# Patient Record
Sex: MALE | Race: WHITE | Employment: FULL TIME | URBAN - METROPOLITAN AREA
[De-identification: names, ages, dates, MRNs, and addresses within clinical notes are randomized per-mention and may not be internally consistent; named-entity substitution may affect disease eponyms.]

---

## 2020-03-05 ENCOUNTER — APPOINTMENT (OUTPATIENT)
Dept: GENERAL RADIOLOGY | Age: 35
End: 2020-03-05
Payer: COMMERCIAL

## 2020-03-05 ENCOUNTER — HOSPITAL ENCOUNTER (EMERGENCY)
Age: 35
Discharge: HOME | End: 2020-03-05
Payer: COMMERCIAL

## 2020-03-05 VITALS
DIASTOLIC BLOOD PRESSURE: 88 MMHG | HEART RATE: 80 BPM | TEMPERATURE: 98.1 F | SYSTOLIC BLOOD PRESSURE: 149 MMHG | RESPIRATION RATE: 14 BRPM | OXYGEN SATURATION: 100 %

## 2020-03-05 LAB
ALBUMIN SERPL-MCNC: 4.6 G/DL (ref 3.5–4.6)
ALP BLD-CCNC: 56 U/L (ref 35–104)
ALT SERPL-CCNC: 41 U/L (ref 0–41)
ANION GAP SERPL CALCULATED.3IONS-SCNC: 11 MEQ/L (ref 9–15)
AST SERPL-CCNC: 32 U/L (ref 0–40)
BASOPHILS ABSOLUTE: 0.1 K/UL (ref 0–0.2)
BASOPHILS RELATIVE PERCENT: 0.9 %
BILIRUB SERPL-MCNC: 0.3 MG/DL (ref 0.2–0.7)
BUN BLDV-MCNC: 12 MG/DL (ref 6–20)
CALCIUM SERPL-MCNC: 9.3 MG/DL (ref 8.5–9.9)
CHLORIDE BLD-SCNC: 102 MEQ/L (ref 95–107)
CK MB: 4.6 NG/ML (ref 0–6.7)
CO2: 27 MEQ/L (ref 20–31)
CREAT SERPL-MCNC: 1.08 MG/DL (ref 0.7–1.2)
CREATINE KINASE-MB INDEX: 0.8 % (ref 0–3.5)
D DIMER: <0.27 MG/L FEU (ref 0–0.5)
EKG ATRIAL RATE: 65 BPM
EKG P AXIS: 56 DEGREES
EKG P-R INTERVAL: 156 MS
EKG Q-T INTERVAL: 392 MS
EKG QRS DURATION: 112 MS
EKG QTC CALCULATION (BAZETT): 407 MS
EKG R AXIS: 1 DEGREES
EKG T AXIS: 36 DEGREES
EKG VENTRICULAR RATE: 65 BPM
EOSINOPHILS ABSOLUTE: 0.1 K/UL (ref 0–0.7)
EOSINOPHILS RELATIVE PERCENT: 1.2 %
GFR AFRICAN AMERICAN: >60
GFR NON-AFRICAN AMERICAN: >60
GLOBULIN: 2.7 G/DL (ref 2.3–3.5)
GLUCOSE BLD-MCNC: 116 MG/DL (ref 70–99)
HCT VFR BLD CALC: 42.9 % (ref 42–52)
HEMOGLOBIN: 14.9 G/DL (ref 14–18)
LYMPHOCYTES ABSOLUTE: 1.9 K/UL (ref 1–4.8)
LYMPHOCYTES RELATIVE PERCENT: 26.1 %
MCH RBC QN AUTO: 30 PG (ref 27–31.3)
MCHC RBC AUTO-ENTMCNC: 34.7 % (ref 33–37)
MCV RBC AUTO: 86.5 FL (ref 80–100)
MONOCYTES ABSOLUTE: 0.5 K/UL (ref 0.2–0.8)
MONOCYTES RELATIVE PERCENT: 7 %
NEUTROPHILS ABSOLUTE: 4.8 K/UL (ref 1.4–6.5)
NEUTROPHILS RELATIVE PERCENT: 64.8 %
PDW BLD-RTO: 13 % (ref 11.5–14.5)
PLATELET # BLD: 212 K/UL (ref 130–400)
POTASSIUM SERPL-SCNC: 3.8 MEQ/L (ref 3.4–4.9)
RBC # BLD: 4.96 M/UL (ref 4.7–6.1)
SODIUM BLD-SCNC: 140 MEQ/L (ref 135–144)
TOTAL CK: 583 U/L (ref 0–190)
TOTAL PROTEIN: 7.3 G/DL (ref 6.3–8)
TROPONIN: <0.01 NG/ML (ref 0–0.01)
WBC # BLD: 7.4 K/UL (ref 4.8–10.8)

## 2020-03-05 PROCEDURE — 71045 X-RAY EXAM CHEST 1 VIEW: CPT

## 2020-03-05 PROCEDURE — 84484 ASSAY OF TROPONIN QUANT: CPT

## 2020-03-05 PROCEDURE — 99285 EMERGENCY DEPT VISIT HI MDM: CPT

## 2020-03-05 PROCEDURE — 36415 COLL VENOUS BLD VENIPUNCTURE: CPT

## 2020-03-05 PROCEDURE — 82553 CREATINE MB FRACTION: CPT

## 2020-03-05 PROCEDURE — 96374 THER/PROPH/DIAG INJ IV PUSH: CPT

## 2020-03-05 PROCEDURE — 6360000002 HC RX W HCPCS: Performed by: PHYSICIAN ASSISTANT

## 2020-03-05 PROCEDURE — 93005 ELECTROCARDIOGRAM TRACING: CPT | Performed by: PHYSICIAN ASSISTANT

## 2020-03-05 PROCEDURE — 82550 ASSAY OF CK (CPK): CPT

## 2020-03-05 PROCEDURE — 2580000003 HC RX 258: Performed by: PHYSICIAN ASSISTANT

## 2020-03-05 PROCEDURE — 85379 FIBRIN DEGRADATION QUANT: CPT

## 2020-03-05 PROCEDURE — 80053 COMPREHEN METABOLIC PANEL: CPT

## 2020-03-05 PROCEDURE — 85025 COMPLETE CBC W/AUTO DIFF WBC: CPT

## 2020-03-05 RX ORDER — KETOROLAC TROMETHAMINE 30 MG/ML
30 INJECTION, SOLUTION INTRAMUSCULAR; INTRAVENOUS ONCE
Status: COMPLETED | OUTPATIENT
Start: 2020-03-05 | End: 2020-03-05

## 2020-03-05 RX ORDER — ETODOLAC 400 MG/1
400 TABLET, FILM COATED ORAL 2 TIMES DAILY
Qty: 14 TABLET | Refills: 0 | Status: SHIPPED | OUTPATIENT
Start: 2020-03-05

## 2020-03-05 RX ORDER — 0.9 % SODIUM CHLORIDE 0.9 %
1000 INTRAVENOUS SOLUTION INTRAVENOUS ONCE
Status: COMPLETED | OUTPATIENT
Start: 2020-03-05 | End: 2020-03-05

## 2020-03-05 RX ADMIN — SODIUM CHLORIDE 1000 ML: 9 INJECTION, SOLUTION INTRAVENOUS at 15:38

## 2020-03-05 RX ADMIN — KETOROLAC TROMETHAMINE 30 MG: 30 INJECTION, SOLUTION INTRAMUSCULAR; INTRAVENOUS at 15:38

## 2020-03-05 ASSESSMENT — ENCOUNTER SYMPTOMS
EYE PAIN: 0
ALLERGIC/IMMUNOLOGIC NEGATIVE: 1
APNEA: 0
COLOR CHANGE: 0
TROUBLE SWALLOWING: 0
ABDOMINAL PAIN: 0
SHORTNESS OF BREATH: 1

## 2020-03-05 ASSESSMENT — PAIN SCALES - GENERAL
PAINLEVEL_OUTOF10: 2
PAINLEVEL_OUTOF10: 2

## 2020-03-05 NOTE — ED NOTES
Bed: 25  Expected date:   Expected time:   Means of arrival:   Comments:  30 male  Left shoulder pain     Arturo Left, RN  03/05/20 [id number]

## 2020-03-05 NOTE — ED PROVIDER NOTES
[address] [facility] ED  eMERGENCY dEPARTMENTeNCOUnter      Pt Name: Trent Martines  MRN: [id number]  [place] 1985  Date ofevaluation: 3/5/2020  Provider: Steve Ochoa PA-C    CHIEF COMPLAINT       Chief Complaint   Patient presents with    Other     pt c/o left arm and shoulder pain no h/o trauma to arm or shoulder  anxiety    states he has a h/o  and has medications           HISTORY OF PRESENT ILLNESS   (Location/Symptom, Timing/Onset,Context/Setting, Quality, Duration, Modifying Factors, Severity)  Note limiting factors. Trent Martines is a 30 y.o. male who presents to the emergency department with complaints of intermittent episodes of left arm pain and associated left axillary chest pain, ongoing for the past several days. Patient states that pain has intermittently been pleuritic in nature. Patient was driving home from work today and states that he was having this axillary chest pain and had associated palpitations, feeling like his heart rate continued to go up and up. Patient states that he had to pull off the side of the road. Patient states that the symptoms have improved after returning the emergency department but he is still having slight chest pain. HPI    NursingNotes were reviewed. REVIEW OF SYSTEMS    (2-9 systems for level 4, 10 or more for level 5)     Review of Systems   Constitutional: Negative for diaphoresis and fever. HENT: Negative for hearing loss and trouble swallowing. Eyes: Negative for pain. Respiratory: Positive for shortness of breath. Negative for apnea. Cardiovascular: Positive for chest pain and palpitations. Gastrointestinal: Negative for abdominal pain. Endocrine: Negative. Genitourinary: Negative for hematuria. Musculoskeletal: Negative for neck pain and neck stiffness. Skin: Negative for color change. Allergic/Immunologic: Negative. Neurological: Negative for dizziness and numbness. Hematological: Negative. Psychiatric/Behavioral: The patient is nervous/anxious. All other systems reviewed and are negative. Except as noted above the remainder of the review of systems was reviewed and negative. PAST MEDICAL HISTORY     Past Medical History:   Diagnosis Date    Bronchitis     sinobronchitis     Depression     Hypercholesterolemia     Lumbar sprain     Lower lumbar midline secondary to spasm     Seasonal allergic rhinitis          SURGICALHISTORY       Past Surgical History:   Procedure Laterality Date    FINGER SURGERY  2006    Right thumb          CURRENT MEDICATIONS       Previous Medications    No medications on file       ALLERGIES     Patient has no known allergies.     FAMILY HISTORY       Family History   Problem Relation Age of Onset    Heart Disease Father     Asthma Father     Diabetes Maternal Grandmother     Heart Disease Maternal Grandmother           SOCIAL HISTORY       Social History     Socioeconomic History    Marital status:      Spouse name: Not on file    Number of children: Not on file    Years of education: Not on file    Highest education level: Not on file   Occupational History    Not on file   Social Needs    Financial resource strain: Not on file    Food insecurity:     Worry: Not on file     Inability: Not on file    Transportation needs:     Medical: Not on file     Non-medical: Not on file   Tobacco Use    Smoking status: Never Smoker    Smokeless tobacco: Never Used   Substance and Sexual Activity    Alcohol use: No    Drug use: No    Sexual activity: Not on file   Lifestyle    Physical activity:     Days per week: Not on file     Minutes per session: Not on file    Stress: Not on file   Relationships    Social connections:     Talks on phone: Not on file     Gets together: Not on file     Attends Alevism service: Not on file     Active member of club or organization: Not on file     Attends meetings of clubs or organizations: Not on file Relationship status: Not on file    Intimate partner violence:     Fear of current or ex partner: Not on file     Emotionally abused: Not on file     Physically abused: Not on file     Forced sexual activity: Not on file   Other Topics Concern    Not on file   Social History Narrative    Not on file       SCREENINGS              PHYSICAL EXAM    (up to 7 for level 4, 8 or more for level 5)     ED Triage Vitals [03/05/20 1515]   BP Temp Temp src Pulse Resp SpO2 Height Weight   (!) 158/92 98.1 °F (36.7 °C) -- 82 16 99 % -- --       Physical Exam  Vitals signs and nursing note reviewed. Constitutional:       General: He is not in acute distress. Appearance: He is well-developed. He is not diaphoretic. HENT:      Head: Normocephalic and atraumatic. Mouth/Throat:      Pharynx: No oropharyngeal exudate. Eyes:      General: No scleral icterus. Conjunctiva/sclera: Conjunctivae normal.      Pupils: Pupils are equal, round, and reactive to light. Neck:      Musculoskeletal: Normal range of motion and neck supple. Trachea: No tracheal deviation. Cardiovascular:      Rate and Rhythm: Normal rate. Heart sounds: Normal heart sounds. Pulmonary:      Effort: Pulmonary effort is normal. No respiratory distress. Breath sounds: Normal breath sounds. Chest:      Chest wall: Tenderness present. No deformity or swelling. Abdominal:      General: Bowel sounds are normal. There is no distension. Palpations: Abdomen is soft. Musculoskeletal: Normal range of motion. Skin:     General: Skin is warm and dry. Findings: No erythema or rash. Neurological:      Mental Status: He is alert and oriented to person, place, and time. Cranial Nerves: No cranial nerve deficit. Motor: No abnormal muscle tone. Psychiatric:         Behavior: Behavior normal.         Thought Content: Thought content normal.         Judgment: Judgment normal.         DIAGNOSTIC RESULTS     EKG:  All Prescriptions    ETODOLAC (LODINE) 400 MG TABLET    Take 1 tablet by mouth 2 times daily     Controlled Substances Monitoring:     No flowsheet data found.     (Please note that portions of this note were completed with a voice recognition program.  Efforts were made to edit the dictations but occasionally words are mis-transcribed.)    Josey Croft PA-C (electronically signed)  Attending Emergency Physician          Josey Croft PA-C  03/05/20 2883

## 2020-03-05 NOTE — ED TRIAGE NOTES
Pt a/ox3 skn w d intact pulse stronga ndregular no visible signs of trauma noted toleft arm   Pt shows no distress

## 2020-03-06 PROCEDURE — 93010 ELECTROCARDIOGRAM REPORT: CPT | Performed by: INTERNAL MEDICINE

## 2023-11-27 ENCOUNTER — LAB (OUTPATIENT)
Dept: LAB | Facility: LAB | Age: 38
End: 2023-11-27
Payer: COMMERCIAL

## 2023-11-27 DIAGNOSIS — Z00.00 ENCOUNTER FOR GENERAL ADULT MEDICAL EXAMINATION WITHOUT ABNORMAL FINDINGS: ICD-10-CM

## 2023-11-27 DIAGNOSIS — F41.9 ANXIETY DISORDER, UNSPECIFIED: Primary | ICD-10-CM

## 2023-11-27 DIAGNOSIS — I10 ESSENTIAL (PRIMARY) HYPERTENSION: ICD-10-CM

## 2023-11-27 LAB
ALBUMIN SERPL BCP-MCNC: 4.7 G/DL (ref 3.4–5)
ALP SERPL-CCNC: 60 U/L (ref 33–120)
ALT SERPL W P-5'-P-CCNC: 70 U/L (ref 10–52)
ANION GAP SERPL CALC-SCNC: 10 MMOL/L (ref 10–20)
AST SERPL W P-5'-P-CCNC: 25 U/L (ref 9–39)
BASOPHILS # BLD AUTO: 0.05 X10*3/UL (ref 0–0.1)
BASOPHILS NFR BLD AUTO: 0.6 %
BILIRUB SERPL-MCNC: 0.6 MG/DL (ref 0–1.2)
BUN SERPL-MCNC: 16 MG/DL (ref 6–23)
CALCIUM SERPL-MCNC: 9.7 MG/DL (ref 8.6–10.3)
CHLORIDE SERPL-SCNC: 100 MMOL/L (ref 98–107)
CHOLEST SERPL-MCNC: 197 MG/DL (ref 0–199)
CHOLESTEROL/HDL RATIO: 5
CO2 SERPL-SCNC: 32 MMOL/L (ref 21–32)
CREAT SERPL-MCNC: 1.26 MG/DL (ref 0.5–1.3)
EOSINOPHIL # BLD AUTO: 0.13 X10*3/UL (ref 0–0.7)
EOSINOPHIL NFR BLD AUTO: 1.7 %
ERYTHROCYTE [DISTWIDTH] IN BLOOD BY AUTOMATED COUNT: 12 % (ref 11.5–14.5)
GFR SERPL CREATININE-BSD FRML MDRD: 76 ML/MIN/1.73M*2
GLUCOSE SERPL-MCNC: 87 MG/DL (ref 74–99)
HCT VFR BLD AUTO: 43.8 % (ref 41–52)
HDLC SERPL-MCNC: 39.6 MG/DL
HGB BLD-MCNC: 15 G/DL (ref 13.5–17.5)
IMM GRANULOCYTES # BLD AUTO: 0.04 X10*3/UL (ref 0–0.7)
IMM GRANULOCYTES NFR BLD AUTO: 0.5 % (ref 0–0.9)
LDLC SERPL CALC-MCNC: 79 MG/DL
LYMPHOCYTES # BLD AUTO: 1.66 X10*3/UL (ref 1.2–4.8)
LYMPHOCYTES NFR BLD AUTO: 21.1 %
MCH RBC QN AUTO: 30.2 PG (ref 26–34)
MCHC RBC AUTO-ENTMCNC: 34.2 G/DL (ref 32–36)
MCV RBC AUTO: 88 FL (ref 80–100)
MONOCYTES # BLD AUTO: 0.91 X10*3/UL (ref 0.1–1)
MONOCYTES NFR BLD AUTO: 11.6 %
NEUTROPHILS # BLD AUTO: 5.07 X10*3/UL (ref 1.2–7.7)
NEUTROPHILS NFR BLD AUTO: 64.5 %
NON HDL CHOLESTEROL: 157 MG/DL (ref 0–149)
NRBC BLD-RTO: 0 /100 WBCS (ref 0–0)
PLATELET # BLD AUTO: 231 X10*3/UL (ref 150–450)
POTASSIUM SERPL-SCNC: 3.7 MMOL/L (ref 3.5–5.3)
PROT SERPL-MCNC: 7.1 G/DL (ref 6.4–8.2)
RBC # BLD AUTO: 4.96 X10*6/UL (ref 4.5–5.9)
SODIUM SERPL-SCNC: 138 MMOL/L (ref 136–145)
TRIGL SERPL-MCNC: 394 MG/DL (ref 0–149)
VLDL: 79 MG/DL (ref 0–40)
WBC # BLD AUTO: 7.9 X10*3/UL (ref 4.4–11.3)

## 2023-11-27 PROCEDURE — 86780 TREPONEMA PALLIDUM: CPT

## 2023-11-27 PROCEDURE — 87522 HEPATITIS C REVRS TRNSCRPJ: CPT

## 2023-11-27 PROCEDURE — 87389 HIV-1 AG W/HIV-1&-2 AB AG IA: CPT

## 2023-11-27 PROCEDURE — 80061 LIPID PANEL: CPT

## 2023-11-27 PROCEDURE — 85025 COMPLETE CBC W/AUTO DIFF WBC: CPT

## 2023-11-27 PROCEDURE — 80053 COMPREHEN METABOLIC PANEL: CPT

## 2023-11-27 PROCEDURE — 36415 COLL VENOUS BLD VENIPUNCTURE: CPT

## 2023-11-28 LAB
HCV RNA SERPL NAA+PROBE-ACNC: NOT DETECTED K[IU]/ML
HCV RNA SERPL NAA+PROBE-LOG IU: NORMAL {LOG_IU}/ML
HIV 1+2 AB+HIV1 P24 AG SERPL QL IA: NONREACTIVE
T PALLIDUM AB SER QL: NONREACTIVE

## 2024-04-03 ENCOUNTER — OFFICE VISIT (OUTPATIENT)
Dept: PRIMARY CARE | Facility: CLINIC | Age: 39
End: 2024-04-03
Payer: COMMERCIAL

## 2024-04-03 VITALS
SYSTOLIC BLOOD PRESSURE: 130 MMHG | DIASTOLIC BLOOD PRESSURE: 79 MMHG | BODY MASS INDEX: 34.36 KG/M2 | RESPIRATION RATE: 16 BRPM | HEIGHT: 69 IN | OXYGEN SATURATION: 95 % | TEMPERATURE: 99.3 F | WEIGHT: 232 LBS | HEART RATE: 93 BPM

## 2024-04-03 DIAGNOSIS — R05.9 COUGH IN ADULT PATIENT: ICD-10-CM

## 2024-04-03 DIAGNOSIS — R09.82 POST-NASAL DRIP: ICD-10-CM

## 2024-04-03 DIAGNOSIS — J02.9 PHARYNGITIS, UNSPECIFIED ETIOLOGY: Primary | ICD-10-CM

## 2024-04-03 DIAGNOSIS — E66.9 CLASS 1 OBESITY WITH BODY MASS INDEX (BMI) OF 34.0 TO 34.9 IN ADULT, UNSPECIFIED OBESITY TYPE, UNSPECIFIED WHETHER SERIOUS COMORBIDITY PRESENT: ICD-10-CM

## 2024-04-03 PROCEDURE — 99203 OFFICE O/P NEW LOW 30 MIN: CPT | Performed by: NURSE PRACTITIONER

## 2024-04-03 RX ORDER — BUPROPION HYDROCHLORIDE 150 MG/1
150 TABLET ORAL DAILY
COMMUNITY

## 2024-04-03 RX ORDER — PROPRANOLOL HYDROCHLORIDE 60 MG/1
60 CAPSULE, EXTENDED RELEASE ORAL DAILY
COMMUNITY
Start: 2023-11-10

## 2024-04-03 RX ORDER — HYDROXYZINE HYDROCHLORIDE 25 MG/1
25 TABLET, FILM COATED ORAL 2 TIMES DAILY PRN
COMMUNITY
Start: 2024-02-26

## 2024-04-03 RX ORDER — PAROXETINE HYDROCHLORIDE 20 MG/1
20 TABLET, FILM COATED ORAL DAILY
COMMUNITY

## 2024-04-03 RX ORDER — AMOXICILLIN 875 MG/1
875 TABLET, FILM COATED ORAL 2 TIMES DAILY
Qty: 20 TABLET | Refills: 0 | Status: SHIPPED | OUTPATIENT
Start: 2024-04-03 | End: 2024-04-13

## 2024-04-03 RX ORDER — HYDROCHLOROTHIAZIDE 25 MG/1
25 TABLET ORAL DAILY
COMMUNITY
Start: 2024-02-05

## 2024-04-03 RX ORDER — BUPROPION HYDROCHLORIDE 150 MG/1
TABLET, EXTENDED RELEASE ORAL
COMMUNITY
Start: 2019-11-01

## 2024-04-03 ASSESSMENT — PATIENT HEALTH QUESTIONNAIRE - PHQ9
SUM OF ALL RESPONSES TO PHQ9 QUESTIONS 1 AND 2: 0
1. LITTLE INTEREST OR PLEASURE IN DOING THINGS: NOT AT ALL
2. FEELING DOWN, DEPRESSED OR HOPELESS: NOT AT ALL
2. FEELING DOWN, DEPRESSED OR HOPELESS: NOT AT ALL
SUM OF ALL RESPONSES TO PHQ9 QUESTIONS 1 AND 2: 0
1. LITTLE INTEREST OR PLEASURE IN DOING THINGS: NOT AT ALL

## 2024-04-03 ASSESSMENT — ENCOUNTER SYMPTOMS
LOSS OF SENSATION IN FEET: 0
OCCASIONAL FEELINGS OF UNSTEADINESS: 0
DEPRESSION: 0

## 2024-04-03 NOTE — PROGRESS NOTES
"Subjective   Patient ID: Gavin Garcia is a 38 y.o. male who presents for URI.    HPI   Symptoms: ear pain (R), cough, headaches, sore throat,trouble swallowing, swollen glands (R), fever 99.6, neck pain and body aches, chills and sweats.  Length of symptoms: yesterday  OTC: acetaminophen with mild help for the fever  Related information: neg covid test yesterday      Review of Systems    Objective   BP (!) 156/93 Comment: auto  Pulse 93   Temp 37.4 °C (99.3 °F)   Resp 16   Ht 1.753 m (5' 9\")   Wt 105 kg (232 lb)   SpO2 95%   BMI 34.26 kg/m²     Physical Exam    Assessment/Plan          "

## 2024-04-03 NOTE — PROGRESS NOTES
Subjective   Patient ID: Gavin Garcia is a 38 y.o. male who is with a chief complaint of symptoms of respiratory tract infection.     HPI  Patient is a 38 y.o. male who CONSULTED AT [facility] today. Patient is with complaints of right ear congestion, headache / sinus pain, sore throat, post nasal drip, mild cough, fatigue, muscle ache, chills, and fever. He denies having nasal congestion, nasal discharge, loss of sense of taste, loss of sense of smell, diarrhea, nor hoarseness. Patient states that present condition started about 5 days ago after being exposed to coworker who is having similar she denies shortness of breath, chest pain, palpitations, nor edema. he stated that he  tried OTC medications which afforded only slight relief of symptoms. he denies nausea, vomiting, abdominal pain, nor any other symptoms.    Patient states he had his COVID vaccine.  Patient states he had the flu shot for this season.    Review of Systems  General: no weight loss, generally healthy, (+) fatigue  Head: (+) headaches / sinus pain, no vertigo, no injury  Eyes: no diplopia, no tearing, no pain,   Ears: (+) right ear congestion, no tinnitus, no bleeding, no vertigo  Mouth:  no dental difficulties, no gingival bleeding, (+) sore throat, no loss of sense of taste, (+) post nasal drip,   Nose: no congestion, no discharge, no bleeding, no obstruction, no loss of sense of smell  Neck: no stiffness, no pain, no tenderness, no masses, no bruit  Pulmonary: no dyspnea, no wheezing, no hemoptysis, (+) mild cough  Cardiovascular: no chest pain, no palpitations, no syncope, no orthopnea  Gastrointestinal: no change in appetite, no dysphagia, no abdominal pains, no diarrhea, no emesis, no melena  Genito Urinary: no dysuria, no urinary urgency, no nocturia, no incontinence, no change in nature of urine  Musculoskeletal: (+) muscle ache, no joint pain, no limitation of range of motion, no paresthesia, no  numbness  Constitutional: (+) fever, (+) chills, no night sweats    Objective   Physical Exam  General: ambulatory, in no acute distress  Head: normocephalic, no lesions, no sinus tenderness  Eyes: pink palpebral conjunctiva, anicteric sclerae, PERRLA, EOM's full  Ears: clear external auditory canals, no ear discharge, no bleeding from the ears, tympanic membrane intact  Nose: no congestion of nasal mucosa, no nasal discharge, no bleeding, no obstruction  Throat: (+) erythema, and (+) exudate on posterior pharyngeal wall, no lesion  Neck: supple, no masses, no bruits, no CLADP  Chest: symmetrical chest expansion, no lagging, no retractions, clear breath sounds, no rales, no wheezes    Assessment/Plan   Problem List Items Addressed This Visit    None  Visit Diagnoses         Codes    Pharyngitis, unspecified etiology    -  Primary J02.9    Relevant Medications    amoxicillin (Amoxil) 875 mg tablet    Cough in adult patient     R05.9    Relevant Medications    amoxicillin (Amoxil) 875 mg tablet    Post-nasal drip     R09.82    Relevant Medications    amoxicillin (Amoxil) 875 mg tablet    BMI 34.0-34.9,adult     Z68.34    Class 1 obesity with body mass index (BMI) of 34.0 to 34.9 in adult, unspecified obesity type, unspecified whether serious comorbidity present     E66.9, Z68.34        DISCHARGE SUMMARY:   Patient was seen and examined. Diagnosis, treatment, treatment options, and possible complications of today's illness discussed and explained to patient. Patient to take medication/s associated with this visit. Patient may take OTC decongestant of choice as needed. Patient may also take OTC analgesic/antipyretic if needed for pain/fever. Advised to increase oral fluid intake. Advised steam inhalation if needed to relieve congestion. Advised warm saline gargle if needed to relieve throat discomfort. Advised Listerine antiseptic mouthwash gargle TID. Patient may use Cepacol oral spray as needed to relieve throat  discomfort. Patient was advised to discard the old toothbrush and use a new toothbrush beginning on the third of antibiotics. Advised to come back if with worsening or persistent symptoms. Patient verbalized understanding of plan of care.    Patient to come back in 7 - 10 days if needed for worsening symptoms.           MAKENNA Ortiz-CNP 04/03/24 11:40 AM

## 2024-08-02 ENCOUNTER — HOSPITAL ENCOUNTER (EMERGENCY)
Age: 39
Discharge: HOME | End: 2024-08-02
Attending: GENERAL PRACTICE
Payer: COMMERCIAL

## 2024-08-02 VITALS
SYSTOLIC BLOOD PRESSURE: 131 MMHG | OXYGEN SATURATION: 97 % | HEIGHT: 69 IN | DIASTOLIC BLOOD PRESSURE: 78 MMHG | BODY MASS INDEX: 31.1 KG/M2 | HEART RATE: 70 BPM | RESPIRATION RATE: 19 BRPM | WEIGHT: 210 LBS | TEMPERATURE: 98 F

## 2024-08-02 DIAGNOSIS — T78.40XA ALLERGIC REACTION, INITIAL ENCOUNTER: Primary | ICD-10-CM

## 2024-08-02 PROCEDURE — 96375 TX/PRO/DX INJ NEW DRUG ADDON: CPT

## 2024-08-02 PROCEDURE — 2580000003 HC RX 258: Performed by: PHYSICIAN ASSISTANT

## 2024-08-02 PROCEDURE — 99284 EMERGENCY DEPT VISIT MOD MDM: CPT

## 2024-08-02 PROCEDURE — 6360000002 HC RX W HCPCS: Performed by: PHYSICIAN ASSISTANT

## 2024-08-02 PROCEDURE — 2500000003 HC RX 250 WO HCPCS: Performed by: PHYSICIAN ASSISTANT

## 2024-08-02 PROCEDURE — 96374 THER/PROPH/DIAG INJ IV PUSH: CPT

## 2024-08-02 RX ORDER — METHYLPREDNISOLONE SODIUM SUCCINATE 125 MG/2ML
125 INJECTION, POWDER, LYOPHILIZED, FOR SOLUTION INTRAMUSCULAR; INTRAVENOUS ONCE
Status: COMPLETED | OUTPATIENT
Start: 2024-08-02 | End: 2024-08-02

## 2024-08-02 RX ORDER — DIPHENHYDRAMINE HCL 25 MG
25 CAPSULE ORAL EVERY 6 HOURS PRN
Qty: 24 CAPSULE | Refills: 0 | Status: SHIPPED | OUTPATIENT
Start: 2024-08-02 | End: 2024-08-12

## 2024-08-02 RX ORDER — FAMOTIDINE 20 MG/1
20 TABLET, FILM COATED ORAL 2 TIMES DAILY
Qty: 10 TABLET | Refills: 0 | Status: SHIPPED | OUTPATIENT
Start: 2024-08-02

## 2024-08-02 RX ORDER — DIPHENHYDRAMINE HYDROCHLORIDE 50 MG/ML
25 INJECTION INTRAMUSCULAR; INTRAVENOUS ONCE
Status: COMPLETED | OUTPATIENT
Start: 2024-08-02 | End: 2024-08-02

## 2024-08-02 RX ORDER — PREDNISONE 10 MG/1
60 TABLET ORAL DAILY
Qty: 30 TABLET | Refills: 0 | Status: SHIPPED | OUTPATIENT
Start: 2024-08-02 | End: 2024-08-07

## 2024-08-02 RX ADMIN — METHYLPREDNISOLONE SODIUM SUCCINATE 125 MG: 125 INJECTION INTRAMUSCULAR; INTRAVENOUS at 22:20

## 2024-08-02 RX ADMIN — DIPHENHYDRAMINE HYDROCHLORIDE 25 MG: 50 INJECTION INTRAMUSCULAR; INTRAVENOUS at 22:19

## 2024-08-02 RX ADMIN — FAMOTIDINE 20 MG: 10 INJECTION, SOLUTION INTRAVENOUS at 22:22

## 2024-08-02 ASSESSMENT — ENCOUNTER SYMPTOMS
TROUBLE SWALLOWING: 0
SHORTNESS OF BREATH: 0
CHEST TIGHTNESS: 0

## 2024-08-02 ASSESSMENT — LIFESTYLE VARIABLES
HOW OFTEN DO YOU HAVE A DRINK CONTAINING ALCOHOL: 2-3 TIMES A WEEK
HOW MANY STANDARD DRINKS CONTAINING ALCOHOL DO YOU HAVE ON A TYPICAL DAY: PATIENT DOES NOT DRINK

## 2024-08-02 ASSESSMENT — PAIN - FUNCTIONAL ASSESSMENT
PAIN_FUNCTIONAL_ASSESSMENT: NONE - DENIES PAIN
PAIN_FUNCTIONAL_ASSESSMENT: NONE - DENIES PAIN

## 2024-08-03 NOTE — ED PROVIDER NOTES
[facility] ED  eMERGENCYdEPARTMENT eNCOUnter        Pt Name: Aleksey Finney  MRN: [id number]  Birthdate 1985of evaluation: 8/2/2024  Provider:Arnel Kidd PA-C  10:05 PM EDT    CHIEF COMPLAINT       Chief Complaint   Patient presents with    Allergic Reaction         HISTORY OF PRESENT ILLNESS  (Location/Symptom, Timing/Onset, Context/Setting, Quality, Duration, Modifying Factors, Severity.)   Aleksey Finney is a 38 y.o. male who presents to the emergency department with an urticarial rash that began approximately 10 to 20 minutes after taking his Zetia.  Patient states he has only been on the Setia for approximately 1 month.  Patient denies any angioedema symptoms, trouble swallowing, chest tightness or shortness of breath.  Patient did try taking Atarax and Benadryl which helped mildly with the symptoms.  No abdominal pain, nausea or vomiting    HPI    Nursing Notes were reviewed and I agree.    REVIEW OF SYSTEMS    (2-9 systems for level 4, 10 or more for level 5)     Review of Systems   HENT:  Negative for trouble swallowing.    Respiratory:  Negative for chest tightness and shortness of breath.    Skin:  Positive for rash. Negative for wound.        as noted above the remainder of the review of systems was reviewed and negative.       PAST MEDICAL HISTORY     Past Medical History:   Diagnosis Date    Bronchitis     sinobronchitis     Depression     Hypercholesterolemia     Lumbar sprain     Lower lumbar midline secondary to spasm     Seasonal allergic rhinitis          SURGICAL HISTORY       Past Surgical History:   Procedure Laterality Date    FINGER SURGERY  2006    Right thumb          CURRENT MEDICATIONS       Previous Medications    ETODOLAC (LODINE) 400 MG TABLET    Take 1 tablet by mouth 2 times daily       ALLERGIES     Iodinated contrast media    HISTORY       Family History   Problem Relation Age of Onset    Heart Disease Father     Asthma Father     Diabetes Maternal

## 2024-08-03 NOTE — ED TRIAGE NOTES
Started at 520pm  with red rash on upper body and both arms, recently started zetia 1 month ago, has CT with contrast on July 16th stated he developed a rash on his feet after but that is almost gone he took 25 mg benadryl 6pm. Denies any pain, denies any difficulty breathing airway is intact. Denies any new food consumed

## 2024-08-03 NOTE — ED NOTES
Pt stable, a&ox4, skin w/d/pink, 0 distress, 0 sob, 0 c/o, per donald Villanueva ok to d/c pt home. Pt out from ed with steady gait, 0 problems. Steady gait noted.

## 2024-10-17 ENCOUNTER — APPOINTMENT (OUTPATIENT)
Dept: PRIMARY CARE | Facility: CLINIC | Age: 39
End: 2024-10-17
Payer: COMMERCIAL

## 2024-10-17 VITALS
DIASTOLIC BLOOD PRESSURE: 99 MMHG | OXYGEN SATURATION: 97 % | BODY MASS INDEX: 30.51 KG/M2 | HEIGHT: 69 IN | WEIGHT: 206 LBS | HEART RATE: 72 BPM | SYSTOLIC BLOOD PRESSURE: 148 MMHG

## 2024-10-17 DIAGNOSIS — F41.9 ANXIETY AND DEPRESSION: ICD-10-CM

## 2024-10-17 DIAGNOSIS — Z79.899 DRUG THERAPY: ICD-10-CM

## 2024-10-17 DIAGNOSIS — Z00.00 PREVENTATIVE HEALTH CARE: Primary | ICD-10-CM

## 2024-10-17 DIAGNOSIS — E55.9 VITAMIN D DEFICIENCY: ICD-10-CM

## 2024-10-17 DIAGNOSIS — F32.A ANXIETY AND DEPRESSION: ICD-10-CM

## 2024-10-17 DIAGNOSIS — E66.811 CLASS 1 OBESITY WITH BODY MASS INDEX (BMI) OF 34.0 TO 34.9 IN ADULT, UNSPECIFIED OBESITY TYPE, UNSPECIFIED WHETHER SERIOUS COMORBIDITY PRESENT: ICD-10-CM

## 2024-10-17 DIAGNOSIS — Z13.9 SCREENING FOR CONDITION: ICD-10-CM

## 2024-10-17 DIAGNOSIS — R07.9 CHEST PAIN, UNSPECIFIED TYPE: ICD-10-CM

## 2024-10-17 DIAGNOSIS — E78.5 HYPERLIPIDEMIA, UNSPECIFIED HYPERLIPIDEMIA TYPE: ICD-10-CM

## 2024-10-17 PROBLEM — G52.2: Status: RESOLVED | Noted: 2024-10-17 | Resolved: 2024-10-17

## 2024-10-17 PROBLEM — J30.2 SEASONAL ALLERGIES: Status: RESOLVED | Noted: 2024-10-17 | Resolved: 2024-10-17

## 2024-10-17 PROBLEM — Z86.79 HISTORY OF HYPERTENSION: Status: RESOLVED | Noted: 2024-10-17 | Resolved: 2024-10-17

## 2024-10-17 PROBLEM — E78.00 HYPERCHOLESTEROLEMIA: Status: RESOLVED | Noted: 2024-10-17 | Resolved: 2024-10-17

## 2024-10-17 PROBLEM — B34.9 VIRAL INFECTION: Status: RESOLVED | Noted: 2024-10-17 | Resolved: 2024-10-17

## 2024-10-17 PROBLEM — Z20.822 SUSPECTED SEVERE ACUTE RESPIRATORY SYNDROME CORONAVIRUS 2 (SARS-COV-2) INFECTION: Status: RESOLVED | Noted: 2024-10-17 | Resolved: 2024-10-17

## 2024-10-17 PROBLEM — K21.9 GASTROESOPHAGEAL REFLUX DISEASE: Status: RESOLVED | Noted: 2024-10-17 | Resolved: 2024-10-17

## 2024-10-17 PROBLEM — R07.89 ATYPICAL CHEST PAIN: Status: RESOLVED | Noted: 2018-05-10 | Resolved: 2024-10-17

## 2024-10-17 PROBLEM — J02.9 SORE THROAT: Status: RESOLVED | Noted: 2024-10-17 | Resolved: 2024-10-17

## 2024-10-17 PROBLEM — M67.40 GANGLION CYST: Status: RESOLVED | Noted: 2024-10-17 | Resolved: 2024-10-17

## 2024-10-17 PROBLEM — B34.9 VIRAL SYNDROME: Status: RESOLVED | Noted: 2024-10-17 | Resolved: 2024-10-17

## 2024-10-17 PROBLEM — I10 HIGH BLOOD PRESSURE: Status: RESOLVED | Noted: 2024-10-17 | Resolved: 2024-10-17

## 2024-10-17 PROCEDURE — 99215 OFFICE O/P EST HI 40 MIN: CPT | Performed by: FAMILY MEDICINE

## 2024-10-17 PROCEDURE — 1036F TOBACCO NON-USER: CPT | Performed by: FAMILY MEDICINE

## 2024-10-17 PROCEDURE — 99395 PREV VISIT EST AGE 18-39: CPT | Performed by: FAMILY MEDICINE

## 2024-10-17 PROCEDURE — 3008F BODY MASS INDEX DOCD: CPT | Performed by: FAMILY MEDICINE

## 2024-10-17 RX ORDER — HYDROXYZINE HYDROCHLORIDE 25 MG/1
TABLET, FILM COATED ORAL
Qty: 60 TABLET | Refills: 1 | Status: SHIPPED | OUTPATIENT
Start: 2024-10-17

## 2024-10-17 RX ORDER — BUPROPION HYDROCHLORIDE 150 MG/1
150 TABLET ORAL DAILY
Qty: 90 TABLET | Refills: 1 | Status: SHIPPED | OUTPATIENT
Start: 2024-10-17

## 2024-10-17 RX ORDER — EZETIMIBE 10 MG/1
10 TABLET ORAL DAILY
COMMUNITY
End: 2024-10-17 | Stop reason: SINTOL

## 2024-10-17 RX ORDER — PAROXETINE HYDROCHLORIDE 20 MG/1
20 TABLET, FILM COATED ORAL DAILY
Qty: 90 TABLET | Refills: 1 | Status: SHIPPED | OUTPATIENT
Start: 2024-10-17

## 2024-10-17 ASSESSMENT — PATIENT HEALTH QUESTIONNAIRE - PHQ9
1. LITTLE INTEREST OR PLEASURE IN DOING THINGS: SEVERAL DAYS
2. FEELING DOWN, DEPRESSED OR HOPELESS: NEARLY EVERY DAY
3. TROUBLE FALLING OR STAYING ASLEEP OR SLEEPING TOO MUCH: NOT AT ALL
9. THOUGHTS THAT YOU WOULD BE BETTER OFF DEAD, OR OF HURTING YOURSELF: NOT AT ALL
6. FEELING BAD ABOUT YOURSELF - OR THAT YOU ARE A FAILURE OR HAVE LET YOURSELF OR YOUR FAMILY DOWN: MORE THAN HALF THE DAYS
4. FEELING TIRED OR HAVING LITTLE ENERGY: NOT AT ALL
SUM OF ALL RESPONSES TO PHQ QUESTIONS 1-9: 6
10. IF YOU CHECKED OFF ANY PROBLEMS, HOW DIFFICULT HAVE THESE PROBLEMS MADE IT FOR YOU TO DO YOUR WORK, TAKE CARE OF THINGS AT HOME, OR GET ALONG WITH OTHER PEOPLE: NOT DIFFICULT AT ALL
5. POOR APPETITE OR OVEREATING: NOT AT ALL
8. MOVING OR SPEAKING SO SLOWLY THAT OTHER PEOPLE COULD HAVE NOTICED. OR THE OPPOSITE, BEING SO FIGETY OR RESTLESS THAT YOU HAVE BEEN MOVING AROUND A LOT MORE THAN USUAL: NOT AT ALL
SUM OF ALL RESPONSES TO PHQ9 QUESTIONS 1 AND 2: 4
7. TROUBLE CONCENTRATING ON THINGS, SUCH AS READING THE NEWSPAPER OR WATCHING TELEVISION: NOT AT ALL

## 2024-10-17 ASSESSMENT — ENCOUNTER SYMPTOMS
LOSS OF SENSATION IN FEET: 0
OCCASIONAL FEELINGS OF UNSTEADINESS: 0
DEPRESSION: 1

## 2024-10-17 NOTE — PATIENT INSTRUCTIONS
Patient here today to establish, get refills of some meds.  Will also do annual preventative visit today.    ----    Previous hep C test was negative.    Immunization counseling: Patient planning to get flu and latest COVID booster next week.  Due for latest tetanus shot. -->>  Check with your pharmacy to keep up-to-date on immunizations.    -----    Obese, BMI 30, weight about the same as a year ago. -->>  Limit intake of simple carbohydrates, things like bread, pasta, potatoes, rice, sugars etc., or alcohol, which her body treats like sugar.      H/o chest pain, hypertension. Has been seeing cardiology/Dr. Sandoval since summer 2024.  Recent CT angiography shows no coronary artery disease.  He is on lisinopril.  Pressure little elevated here today.  They are titrating his dosing.  -->>  Follow-up as planned.    Depression with anxiety.  Previously doing reasonably well on bupropion and paroxetine daily.  Had hydroxyzine for breakthrough anxiety, using it less than once a week on average. -->>  Refilled hydroxyzine, Paxil, Wellbutrin.  Gave patient handout on cognitive distortions to review.  Recommend 15 minutes every day of meditation/visualization/guided imagery, etc.      Reviewed previous labs:    - Hyperlipidemia.  Last LDL was 79, was less than 100.  Triglycerides elevated at 394.  Failed Zettie/allergy. -->>  Discontinue Zetia.  Will plan to restart on something for cholesterol after we get annual labs.    -Elevated liver enzymes.  AST/ALT 25/70. -->>  Will be rechecking with next labs.    -Vitamin D deficiency.  No vitamin D noted on the chart. -->>  Start 10,000 units / 250 mcg daily of vitamin D3.  Will be checking with next labs.        - Will schedule virtual appointment first week of December for annual lab review and mood check.  -Patient will come in here about a week before the appointment to get fasting annual labs drawn.

## 2024-10-17 NOTE — PROGRESS NOTES
"Subjective   Patient ID: Gavin Garcia is a 38 y.o. male who presents for Establish Care (Pt in today to establish care / restart medications).    Review of Systems  Denies N/V/D/C, no HA/S/V, denies rashes/lesions, no SOB. Denies fevers/chills.  For occasional chest pain.  All other systems were negative.     Objective   BP (!) 148/99 (BP Location: Right arm, Patient Position: Sitting, BP Cuff Size: Adult)   Pulse 72   Ht 1.753 m (5' 9\")   Wt 93.4 kg (206 lb)   SpO2 97%   BMI 30.42 kg/m²     Physical Exam  Gen: NAD  eyes: EOMI, PERRLA  ENT: hearing grossly intact, no nasal discharge  resp: CTABL, without R/R  heart: RRR without MRG  GI: abd: S/ND/NT, BS+  lymph: no axillary, cervical, supraclavicular lymphadenopathy noted   MS: gait grossly WNL,  derm: no rashes or lesions noted  neuro: CN II-XII grossly intact  psych: A&Ox3    Assessment/Plan   Problem List Items Addressed This Visit    None  Visit Diagnoses         Codes    Preventative health care    -  Primary Z00.00    Anxiety and depression     F41.9, F32.A    Relevant Medications    buPROPion XL (Wellbutrin XL) 150 mg 24 hr tablet    PARoxetine (Paxil) 20 mg tablet    hydrOXYzine HCL (Atarax) 25 mg tablet    Drug therapy     Z79.899    Relevant Orders    CBC and Auto Differential    Comprehensive Metabolic Panel    Magnesium    Urinalysis with Reflex Microscopic    Screening for condition     Z13.9    Relevant Orders    TSH with reflex to Free T4 if abnormal    Hemoglobin A1C    Vitamin D deficiency     E55.9    Relevant Orders    Vitamin D 25-Hydroxy,Total (for eval of Vitamin D levels)    Hyperlipidemia, unspecified hyperlipidemia type     E78.5    Relevant Orders    Lipid Panel    Class 1 obesity with body mass index (BMI) of 34.0 to 34.9 in adult, unspecified obesity type, unspecified whether serious comorbidity present     E66.811, Z68.34    Chest pain, unspecified type     R07.9          Patient here today to establish, get refills of some " meds.  Will also do annual preventative visit today.    ----    Previous hep C test was negative.    Immunization counseling: Patient planning to get flu and latest COVID booster next week.  Due for latest tetanus shot. -->>  Check with your pharmacy to keep up-to-date on immunizations.    -----    Obese, BMI 30, weight about the same as a year ago.  Down from a max of around 216-220-->>  Limit intake of simple carbohydrates, things like bread, pasta, potatoes, rice, sugars etc., or alcohol, which her body treats like sugar.      H/o chest pain, hypertension. Has been seeing cardiology/Dr. Sandoval since summer 2024.  Recent CT angiography shows no coronary artery disease.  He is on lisinopril.  Pressure little elevated here today.  They are titrating his dosing.  -->>  Follow-up as planned.    Depression with anxiety.  Previously doing reasonably well on bupropion and paroxetine daily.  Had hydroxyzine for breakthrough anxiety, using it less than once a week on average. -->>  Refilled hydroxyzine, Paxil, Wellbutrin.  Gave patient handout on cognitive distortions to review.  Recommend 15 minutes every day of meditation/visualization/guided imagery, etc.      Reviewed previous labs:    - Hyperlipidemia.  Last LDL was 79, was less than 100.  Triglycerides elevated at 394.  Failed Zettie/allergy. -->>  Discontinue Zetia.  Will plan to restart on something for cholesterol after we get annual labs.    -Elevated liver enzymes.  AST/ALT 25/70. -->>  Will be rechecking with next labs.    -Vitamin D deficiency.  No vitamin D noted on the chart. -->>  Start 10,000 units / 250 mcg daily of vitamin D3.  Will be checking with next labs.        - Will schedule virtual appointment first week of December for annual lab review and mood check.  -Patient will come in here about a week before the appointment to get fasting annual labs drawn.

## 2024-12-12 ENCOUNTER — APPOINTMENT (OUTPATIENT)
Dept: PRIMARY CARE | Facility: CLINIC | Age: 39
End: 2024-12-12
Payer: COMMERCIAL

## 2024-12-12 DIAGNOSIS — E78.5 HYPERLIPIDEMIA, UNSPECIFIED HYPERLIPIDEMIA TYPE: ICD-10-CM

## 2024-12-12 DIAGNOSIS — Z13.9 SCREENING FOR CONDITION: ICD-10-CM

## 2024-12-12 DIAGNOSIS — Z79.899 DRUG THERAPY: ICD-10-CM

## 2024-12-12 DIAGNOSIS — E55.9 VITAMIN D DEFICIENCY: ICD-10-CM

## 2024-12-12 LAB
25(OH)D3 SERPL-MCNC: 16 NG/ML (ref 30–100)
ALBUMIN SERPL BCP-MCNC: 4.6 G/DL (ref 3.4–5)
ALP SERPL-CCNC: 65 U/L (ref 33–120)
ALT SERPL W P-5'-P-CCNC: 76 U/L (ref 10–52)
ANION GAP SERPL CALC-SCNC: 14 MMOL/L (ref 10–20)
AST SERPL W P-5'-P-CCNC: 46 U/L (ref 9–39)
BASOPHILS # BLD AUTO: 0.07 X10*3/UL (ref 0–0.1)
BASOPHILS NFR BLD AUTO: 1 %
BILIRUB SERPL-MCNC: 0.7 MG/DL (ref 0–1.2)
BUN SERPL-MCNC: 19 MG/DL (ref 6–23)
CALCIUM SERPL-MCNC: 9.3 MG/DL (ref 8.6–10.3)
CHLORIDE SERPL-SCNC: 101 MMOL/L (ref 98–107)
CHOLEST SERPL-MCNC: 298 MG/DL (ref 0–199)
CHOLESTEROL/HDL RATIO: 6.6
CO2 SERPL-SCNC: 26 MMOL/L (ref 21–32)
CREAT SERPL-MCNC: 1.18 MG/DL (ref 0.5–1.3)
EGFRCR SERPLBLD CKD-EPI 2021: 82 ML/MIN/1.73M*2
EOSINOPHIL # BLD AUTO: 0.09 X10*3/UL (ref 0–0.7)
EOSINOPHIL NFR BLD AUTO: 1.3 %
ERYTHROCYTE [DISTWIDTH] IN BLOOD BY AUTOMATED COUNT: 12.5 % (ref 11.5–14.5)
EST. AVERAGE GLUCOSE BLD GHB EST-MCNC: 105 MG/DL
GLUCOSE SERPL-MCNC: 112 MG/DL (ref 74–99)
HBA1C MFR BLD: 5.3 %
HCT VFR BLD AUTO: 45.7 % (ref 41–52)
HDLC SERPL-MCNC: 44.9 MG/DL
HGB BLD-MCNC: 15.5 G/DL (ref 13.5–17.5)
IMM GRANULOCYTES # BLD AUTO: 0.12 X10*3/UL (ref 0–0.7)
IMM GRANULOCYTES NFR BLD AUTO: 1.7 % (ref 0–0.9)
LDLC SERPL CALC-MCNC: ABNORMAL MG/DL
LYMPHOCYTES # BLD AUTO: 1.87 X10*3/UL (ref 1.2–4.8)
LYMPHOCYTES NFR BLD AUTO: 26.3 %
MAGNESIUM SERPL-MCNC: 2.21 MG/DL (ref 1.6–2.4)
MCH RBC QN AUTO: 30.6 PG (ref 26–34)
MCHC RBC AUTO-ENTMCNC: 33.9 G/DL (ref 32–36)
MCV RBC AUTO: 90 FL (ref 80–100)
MONOCYTES # BLD AUTO: 0.73 X10*3/UL (ref 0.1–1)
MONOCYTES NFR BLD AUTO: 10.3 %
NEUTROPHILS # BLD AUTO: 4.24 X10*3/UL (ref 1.2–7.7)
NEUTROPHILS NFR BLD AUTO: 59.4 %
NON HDL CHOLESTEROL: 253 MG/DL (ref 0–149)
NRBC BLD-RTO: 0 /100 WBCS (ref 0–0)
PLATELET # BLD AUTO: 230 X10*3/UL (ref 150–450)
POTASSIUM SERPL-SCNC: 4.5 MMOL/L (ref 3.5–5.3)
PROT SERPL-MCNC: 7.3 G/DL (ref 6.4–8.2)
RBC # BLD AUTO: 5.07 X10*6/UL (ref 4.5–5.9)
SODIUM SERPL-SCNC: 136 MMOL/L (ref 136–145)
TRIGL SERPL-MCNC: 404 MG/DL (ref 0–149)
TSH SERPL-ACNC: 3.02 MIU/L (ref 0.44–3.98)
VLDL: ABNORMAL
WBC # BLD AUTO: 7.1 X10*3/UL (ref 4.4–11.3)

## 2024-12-12 PROCEDURE — 83735 ASSAY OF MAGNESIUM: CPT

## 2024-12-12 PROCEDURE — 80053 COMPREHEN METABOLIC PANEL: CPT

## 2024-12-12 PROCEDURE — 80061 LIPID PANEL: CPT

## 2024-12-12 PROCEDURE — 82306 VITAMIN D 25 HYDROXY: CPT

## 2024-12-12 PROCEDURE — 84443 ASSAY THYROID STIM HORMONE: CPT

## 2024-12-12 PROCEDURE — 83036 HEMOGLOBIN GLYCOSYLATED A1C: CPT

## 2024-12-12 PROCEDURE — 85025 COMPLETE CBC W/AUTO DIFF WBC: CPT

## 2024-12-12 NOTE — PROGRESS NOTES
Subjective   Patient ID: Gavin Garcia is a 38 y.o. male who presents for Labs Only (PT in today for lab draw).    HPI     Review of Systems    Objective   There were no vitals taken for this visit.    Physical Exam    Assessment/Plan

## 2024-12-19 ENCOUNTER — APPOINTMENT (OUTPATIENT)
Dept: PRIMARY CARE | Facility: CLINIC | Age: 39
End: 2024-12-19
Payer: COMMERCIAL

## 2024-12-19 DIAGNOSIS — F32.A ANXIETY AND DEPRESSION: ICD-10-CM

## 2024-12-19 DIAGNOSIS — E66.811 CLASS 1 OBESITY WITH BODY MASS INDEX (BMI) OF 34.0 TO 34.9 IN ADULT, UNSPECIFIED OBESITY TYPE, UNSPECIFIED WHETHER SERIOUS COMORBIDITY PRESENT: ICD-10-CM

## 2024-12-19 DIAGNOSIS — Z13.9 SCREENING FOR CONDITION: ICD-10-CM

## 2024-12-19 DIAGNOSIS — R07.9 CHEST PAIN, UNSPECIFIED TYPE: ICD-10-CM

## 2024-12-19 DIAGNOSIS — E55.9 VITAMIN D DEFICIENCY: Primary | ICD-10-CM

## 2024-12-19 DIAGNOSIS — R74.8 ELEVATED LIVER ENZYMES: ICD-10-CM

## 2024-12-19 DIAGNOSIS — Z79.899 DRUG THERAPY: ICD-10-CM

## 2024-12-19 DIAGNOSIS — E78.5 HYPERLIPIDEMIA, UNSPECIFIED HYPERLIPIDEMIA TYPE: ICD-10-CM

## 2024-12-19 DIAGNOSIS — F41.9 ANXIETY AND DEPRESSION: ICD-10-CM

## 2024-12-19 PROCEDURE — 99214 OFFICE O/P EST MOD 30 MIN: CPT | Performed by: FAMILY MEDICINE

## 2024-12-19 NOTE — PATIENT INSTRUCTIONS
2025 will be next annual preventative visit.    ----    Previous hep C test was negative.    Immunization counseling: Up-to-date on annual flu shot as well as latest COVID booster.  Due for latest tetanus shot. -->>  Keep up the good work.  Check with your pharmacy to keep up-to-date on immunizations.    -----    Obese, BMI 30, not weighed today, virtual appointment.   Down from a max of around 216-220-->>  Limit intake of simple carbohydrates, things like bread, pasta, potatoes, rice, sugars etc., or alcohol, which her body treats like sugar.          Reviewed previous labs:    Around march will recheck CMP , mg, fasting lipid panel, vitamin D.    - Hyperlipidemia.  LDL not calculable, triglycerides 404, total cholesterol 298, was 197 last time when the LDL was 78.  Last LDL was 79, was less than 100.  Triglycerides elevated at 394.  Failed Zetia/allergy. -->>   Will plan to restart on atorvastatin once vitamin D is closer to 50 or higher.    -Elevated liver enzymes.  AST/ALT 46/76, 25/70. -->>  Will be rechecking with next labs.    -Vitamin D deficiency.   16, goal is .  Did just purchase vitamin D in the last week or so and has started taking it but not sure of the dose. -->> Start 10,000 units / 250 mcg daily of vitamin D3.  Will be checking with next labs.    - Drug therapy, screening for conditions: A1c is 5.3, so no diabetes.  Thyroid test is also normal.      H/o chest pain, hypertension. Has been seeing cardiology/Dr. Sandoval since summer 2024.  Recent CT angiography shows no coronary artery disease.  He is on lisinopril.  Pressure little elevated here today.  They are titrating his dosing.  -->>  Follow-up as planned.    Depression with anxiety.  Mood significantly improved, back pretty much to baseline since restarted on meds.  Only taking hydroxyzine as needed and not needed it in a little over a month. -->>  Refilled hydroxyzine, Paxil, Wellbutrin as needed. Recommend 5 minutes every day of  meditation/visualization/guided imagery, etc.      - Will return in about 3 months for annual preventative visit and lab review.    - Patient will come in here about a week before the appointment to get fasting lipid panel, CMP, magnesium, vitamin D.

## 2024-12-19 NOTE — PROGRESS NOTES
Subjective   Patient ID: Gavin Garcia is a 38 y.o. male who presents for Virtual Visit (Pt being seen virtually for lab review FU).    Review of Systems  Denies N/V/D/C, no HA/S/V, denies rashes/lesions, no SOB. Denies fevers/chills.  For occasional chest pain.  All other systems were negative.     Objective   There were no vitals taken for this visit.    Physical Exam  Gen: NAD  eyes: EOMI, PERRLA  ENT: hearing grossly intact, no nasal discharge  resp: CTABL, without R/R  heart: RRR without MRG  GI: abd: S/ND/NT, BS+  lymph: no axillary, cervical, supraclavicular lymphadenopathy noted   MS: gait grossly WNL,  derm: no rashes or lesions noted  neuro: CN II-XII grossly intact  psych: A&Ox3    Assessment/Plan   Problem List Items Addressed This Visit    None  Visit Diagnoses         Codes    Vitamin D deficiency    -  Primary E55.9    Drug therapy     Z79.899    Screening for condition     Z13.9    Class 1 obesity with body mass index (BMI) of 34.0 to 34.9 in adult, unspecified obesity type, unspecified whether serious comorbidity present     E66.811, Z68.34    Anxiety and depression     F41.9, F32.A    Hyperlipidemia, unspecified hyperlipidemia type     E78.5    Chest pain, unspecified type     R07.9            Virtual or Telephone Consent    An interactive audio and video telecommunication system which permits real time communications between the patient (at the originating site) and provider (at the distant site) was utilized to provide this telehealth service.   Verbal consent was requested and obtained from Gavin Garcia on this date, 12/19/24 for a telehealth visit.       2025 will be next annual preventative visit.    ----    Previous hep C test was negative.    Immunization counseling: Up-to-date on annual flu shot as well as latest COVID booster.  Due for latest tetanus shot. -->>  Keep up the good work.  Check with your pharmacy to keep up-to-date on immunizations.    -----    Obese, BMI 30, not  weighed today, virtual appointment.   Down from a max of around 216-220-->>  Limit intake of simple carbohydrates, things like bread, pasta, potatoes, rice, sugars etc., or alcohol, which her body treats like sugar.          Reviewed previous labs:    Around march will recheck CMP , mg, fasting lipid panel, vitamin D.    - Hyperlipidemia.  LDL not calculable, triglycerides 404, total cholesterol 298, was 197 last time when the LDL was 78.  Last LDL was 79, was less than 100.  Triglycerides elevated at 394.  Failed Zetia/allergy. -->>   Will plan to restart on atorvastatin once vitamin D is closer to 50 or higher.    -Elevated liver enzymes.  AST/ALT 46/76, 25/70. -->>  Will be rechecking with next labs.    -Vitamin D deficiency.   16, goal is .  Did just purchase vitamin D in the last week or so and has started taking it but not sure of the dose. -->> Start 10,000 units / 250 mcg daily of vitamin D3.  Will be checking with next labs.    - Drug therapy, screening for conditions: A1c is 5.3, so no diabetes.  Thyroid test is also normal.      H/o chest pain, hypertension. Has been seeing cardiology/Dr. Sandoval since summer 2024.  Recent CT angiography shows no coronary artery disease.  He is on lisinopril.  Pressure little elevated here today.  They are titrating his dosing.  -->>  Follow-up as planned.    Depression with anxiety.  Mood significantly improved, back pretty much to baseline since restarted on meds.  Only taking hydroxyzine as needed and not needed it in a little over a month. -->>  Refilled hydroxyzine, Paxil, Wellbutrin as needed. Recommend 5 minutes every day of meditation/visualization/guided imagery, etc.      - Will return in about 3 months for annual preventative visit and lab review.    - Patient will come in here about a week before the appointment to get fasting lipid panel, CMP, magnesium, vitamin D.

## 2025-03-12 ENCOUNTER — APPOINTMENT (OUTPATIENT)
Dept: PRIMARY CARE | Facility: CLINIC | Age: 40
End: 2025-03-12
Payer: COMMERCIAL

## 2025-03-12 DIAGNOSIS — E78.5 HYPERLIPIDEMIA, UNSPECIFIED HYPERLIPIDEMIA TYPE: ICD-10-CM

## 2025-03-12 DIAGNOSIS — E55.9 VITAMIN D DEFICIENCY: ICD-10-CM

## 2025-03-12 DIAGNOSIS — R74.8 ELEVATED LIVER ENZYMES: ICD-10-CM

## 2025-03-12 DIAGNOSIS — Z79.899 DRUG THERAPY: ICD-10-CM

## 2025-03-12 NOTE — PROGRESS NOTES
Subjective   Patient ID: Gavin Garcia is a 39 y.o. male who presents for Labs Only (PT IN TODAY FOR LAB DRAW).    HPI     Review of Systems    Objective   There were no vitals taken for this visit.    Physical Exam    Assessment/Plan

## 2025-03-13 LAB
25(OH)D3+25(OH)D2 SERPL-MCNC: 16 NG/ML (ref 30–100)
ALBUMIN SERPL-MCNC: 4.4 G/DL (ref 3.6–5.1)
ALP SERPL-CCNC: 50 U/L (ref 36–130)
ALT SERPL-CCNC: 43 U/L (ref 9–46)
ANION GAP SERPL CALCULATED.4IONS-SCNC: 12 MMOL/L (CALC) (ref 7–17)
AST SERPL-CCNC: 35 U/L (ref 10–40)
BILIRUB SERPL-MCNC: 0.7 MG/DL (ref 0.2–1.2)
BUN SERPL-MCNC: 13 MG/DL (ref 7–25)
CALCIUM SERPL-MCNC: 9.3 MG/DL (ref 8.6–10.3)
CHLORIDE SERPL-SCNC: 105 MMOL/L (ref 98–110)
CHOLEST SERPL-MCNC: 227 MG/DL
CHOLEST/HDLC SERPL: 5.4 (CALC)
CO2 SERPL-SCNC: 22 MMOL/L (ref 20–32)
CREAT SERPL-MCNC: 1.18 MG/DL (ref 0.6–1.26)
EGFRCR SERPLBLD CKD-EPI 2021: 82 ML/MIN/1.73M2
GLUCOSE SERPL-MCNC: 99 MG/DL (ref 65–99)
HDLC SERPL-MCNC: 42 MG/DL
LDLC SERPL CALC-MCNC: 145 MG/DL (CALC)
MAGNESIUM SERPL-MCNC: 2.2 MG/DL (ref 1.5–2.5)
NONHDLC SERPL-MCNC: 185 MG/DL (CALC)
POTASSIUM SERPL-SCNC: 4.4 MMOL/L (ref 3.5–5.3)
PROT SERPL-MCNC: 6.6 G/DL (ref 6.1–8.1)
SODIUM SERPL-SCNC: 139 MMOL/L (ref 135–146)
TRIGL SERPL-MCNC: 247 MG/DL

## 2025-03-19 ENCOUNTER — APPOINTMENT (OUTPATIENT)
Dept: PRIMARY CARE | Facility: CLINIC | Age: 40
End: 2025-03-19
Payer: COMMERCIAL

## 2025-03-19 VITALS
HEART RATE: 64 BPM | BODY MASS INDEX: 30.21 KG/M2 | WEIGHT: 204 LBS | OXYGEN SATURATION: 97 % | DIASTOLIC BLOOD PRESSURE: 105 MMHG | HEIGHT: 69 IN | SYSTOLIC BLOOD PRESSURE: 172 MMHG

## 2025-03-19 DIAGNOSIS — E55.9 VITAMIN D DEFICIENCY: ICD-10-CM

## 2025-03-19 DIAGNOSIS — R74.8 ELEVATED LIVER ENZYMES: ICD-10-CM

## 2025-03-19 DIAGNOSIS — Z13.9 SCREENING FOR CONDITION: ICD-10-CM

## 2025-03-19 DIAGNOSIS — F41.9 ANXIETY AND DEPRESSION: ICD-10-CM

## 2025-03-19 DIAGNOSIS — F32.A ANXIETY AND DEPRESSION: ICD-10-CM

## 2025-03-19 DIAGNOSIS — Z00.00 PREVENTATIVE HEALTH CARE: Primary | ICD-10-CM

## 2025-03-19 DIAGNOSIS — I10 HYPERTENSION, UNSPECIFIED TYPE: ICD-10-CM

## 2025-03-19 DIAGNOSIS — Z79.899 DRUG THERAPY: ICD-10-CM

## 2025-03-19 DIAGNOSIS — E78.5 HYPERLIPIDEMIA, UNSPECIFIED HYPERLIPIDEMIA TYPE: ICD-10-CM

## 2025-03-19 DIAGNOSIS — E66.811 CLASS 1 OBESITY WITH BODY MASS INDEX (BMI) OF 34.0 TO 34.9 IN ADULT, UNSPECIFIED OBESITY TYPE, UNSPECIFIED WHETHER SERIOUS COMORBIDITY PRESENT: ICD-10-CM

## 2025-03-19 PROCEDURE — 99395 PREV VISIT EST AGE 18-39: CPT | Performed by: FAMILY MEDICINE

## 2025-03-19 PROCEDURE — 3008F BODY MASS INDEX DOCD: CPT | Performed by: FAMILY MEDICINE

## 2025-03-19 PROCEDURE — 3080F DIAST BP >= 90 MM HG: CPT | Performed by: FAMILY MEDICINE

## 2025-03-19 PROCEDURE — 1036F TOBACCO NON-USER: CPT | Performed by: FAMILY MEDICINE

## 2025-03-19 PROCEDURE — 3077F SYST BP >= 140 MM HG: CPT | Performed by: FAMILY MEDICINE

## 2025-03-19 PROCEDURE — 99214 OFFICE O/P EST MOD 30 MIN: CPT | Performed by: FAMILY MEDICINE

## 2025-03-19 RX ORDER — LISINOPRIL 5 MG/1
5 TABLET ORAL DAILY
Qty: 90 TABLET | Refills: 3 | Status: SHIPPED | OUTPATIENT
Start: 2025-03-19 | End: 2026-04-23

## 2025-03-19 NOTE — PROGRESS NOTES
"Subjective   Patient ID: Gavin Garcia is a 39 y.o. male who presents for 3 Month FU (Pt in today for routine lab review FU).    Review of Systems  Denies N/V/D/C, no HA/S/V, denies rashes/lesions, no SOB. Denies fevers/chills.  For occasional chest pain.  All other systems were negative.     Objective   BP (!) 172/105 (BP Location: Right arm, Patient Position: Sitting, BP Cuff Size: Adult)   Pulse 64   Ht 1.753 m (5' 9\")   Wt 92.5 kg (204 lb)   SpO2 97%   BMI 30.13 kg/m²     Physical Exam  Gen: NAD  eyes: EOMI, PERRLA  ENT: hearing grossly intact, no nasal discharge  resp: CTABL, without R/R  heart: RRR without MRG  GI: abd: S/ND/NT, BS+  lymph: no axillary, cervical, supraclavicular lymphadenopathy noted   MS: gait grossly WNL,  derm: no rashes or lesions noted  neuro: CN II-XII grossly intact  psych: A&Ox3    Assessment/Plan   Problem List Items Addressed This Visit    None  Visit Diagnoses         Codes    Preventative health care    -  Primary Z00.00    Vitamin D deficiency     E55.9    Hyperlipidemia, unspecified hyperlipidemia type     E78.5    Drug therapy     Z79.899    Elevated liver enzymes     R74.8    Screening for condition     Z13.9    Anxiety and depression     F41.9, F32.A    Class 1 obesity with body mass index (BMI) of 34.0 to 34.9 in adult, unspecified obesity type, unspecified whether serious comorbidity present     E66.811, Z68.34    Hypertension, unspecified type     I10    Relevant Medications    lisinopril 5 mg tablet                  annual preventative visit.      ----    Previous hep C test was negative.    Immunization counseling: Up-to-date on annual flu shot as well as latest COVID booster.  Due for latest tetanus shot. -->>Check with your pharmacy to keep up-to-date on immunizations.    -----    Obese, BMI 30, down about 2 pounds since last appointment here 5 or 6 months ago.   Down from a max of around 216-220-->>  Limit intake of simple carbohydrates, things like bread, " pasta, potatoes, rice, sugars etc., or alcohol, which her body treats like sugar.          New labs reviewed:    - Hyperlipidemia.  , was not calculable, goal is less than 100.  HDL 42, was 45, goal is over 45.  Triglycerides 247, was 404.  Failed Zetia/allergy. -->>   Will plan to restart on atorvastatin once vitamin D is closer to 50 or higher.    - Elevated liver enzymes.  AST/ALT 35/43, 46/76, 25/70.  Improved.-->>  Will be rechecking with next annual labs.    -Vitamin D deficiency.   16, goal is .  Did just purchase vitamin D in the last week or so and has started taking it but not sure of the dose. -->> Start 10,000 units / 250 mcg daily of vitamin D3.  Can buy 200 pills for $10 at [employer].  Will be checking with next labs.      Regarding previous labs:    - Drug therapy, screening for conditions: A1c is 5.3, so no diabetes.  Thyroid test is also normal.      H/o chest pain, hypertension.  Blood pressure is high today.  Has been out of lisinopril, previously prescribed by cardiology.  Has previously failed hydrochlorothiazide (ineffective) and propranolol (hypotension). Was seeing cardiology/Dr. Sandoval. CT angiography shows no coronary artery disease. -->> restart on 5 mg lisinopril.  Also recommend contacting cardiology to schedule follow-up. After 2 to 3 months on this blood pressure pill, monitor your resting blood pressure a couple times a week.  If the top numbers are 130 or higher or the bottom numbers are 80 or higher, call us and I will double your dose to 10 mg.      Depression with anxiety.  Mood significantly improved, back pretty much to baseline since restarted on meds.  Has hydroxyzine to take if needed, but not taking it for at least several months.  . -->>  Refilled hydroxyzine, Paxil, Wellbutrin as needed. Recommend 5 minutes every day of meditation/visualization/guided imagery, etc.      - Will return 1st week of August Review and follow-up.

## 2025-03-19 NOTE — PATIENT INSTRUCTIONS
annual preventative visit.      ----    Previous hep C test was negative.    Immunization counseling: Up-to-date on annual flu shot as well as latest COVID booster.  Due for latest tetanus shot. -->>Check with your pharmacy to keep up-to-date on immunizations.    -----    Obese, BMI 30, down about 2 pounds since last appointment here 5 or 6 months ago.   Down from a max of around 216-220-->>  Limit intake of simple carbohydrates, things like bread, pasta, potatoes, rice, sugars etc., or alcohol, which her body treats like sugar.          New labs reviewed:    - Hyperlipidemia.  , was not calculable, goal is less than 100.  HDL 42, was 45, goal is over 45.  Triglycerides 247, was 404.  Failed Zetia/allergy. -->>   Will plan to restart on atorvastatin once vitamin D is closer to 50 or higher.    - Elevated liver enzymes.  AST/ALT 35/43, 46/76, 25/70.  Improved.-->>  Will be rechecking with next annual labs.    -Vitamin D deficiency.   16, goal is .  Did just purchase vitamin D in the last week or so and has started taking it but not sure of the dose. -->> Start 10,000 units / 250 mcg daily of vitamin D3.  Can buy 200 pills for $10 at [employer].  Will be checking with next labs.      Regarding previous labs:    - Drug therapy, screening for conditions: A1c is 5.3, so no diabetes.  Thyroid test is also normal.      H/o chest pain, hypertension.  Blood pressure is high today.  Has been out of lisinopril, previously prescribed by cardiology.  Has previously failed hydrochlorothiazide (ineffective) and propranolol (hypotension). Was seeing cardiology/Dr. Sandoval. CT angiography shows no coronary artery disease. -->> restart on 5 mg lisinopril.  Also recommend contacting cardiology to schedule follow-up. After 2 to 3 months on this blood pressure pill, monitor your resting blood pressure a couple times a week.  If the top numbers are 130 or higher or the bottom numbers are 80 or higher, call us and I will double  your dose to 10 mg.      Depression with anxiety.  Mood significantly improved, back pretty much to baseline since restarted on meds.  Has hydroxyzine to take if needed, but not taking it for at least several months.  . -->>  Refilled hydroxyzine, Paxil, Wellbutrin as needed. Recommend 5 minutes every day of meditation/visualization/guided imagery, etc.      - Will return 1st week of August Review and follow-up.

## 2025-03-27 DIAGNOSIS — F41.9 ANXIETY AND DEPRESSION: ICD-10-CM

## 2025-03-27 DIAGNOSIS — F32.A ANXIETY AND DEPRESSION: ICD-10-CM

## 2025-03-27 RX ORDER — PAROXETINE HYDROCHLORIDE 20 MG/1
20 TABLET, FILM COATED ORAL DAILY
Qty: 90 TABLET | Refills: 1 | Status: SHIPPED | OUTPATIENT
Start: 2025-03-27

## 2025-03-27 RX ORDER — BUPROPION HYDROCHLORIDE 150 MG/1
150 TABLET ORAL DAILY
Qty: 90 TABLET | Refills: 1 | Status: SHIPPED | OUTPATIENT
Start: 2025-03-27

## 2025-08-13 ENCOUNTER — APPOINTMENT (OUTPATIENT)
Dept: PRIMARY CARE | Facility: CLINIC | Age: 40
End: 2025-08-13
Payer: COMMERCIAL

## 2025-08-18 ENCOUNTER — APPOINTMENT (OUTPATIENT)
Dept: PRIMARY CARE | Facility: CLINIC | Age: 40
End: 2025-08-18
Payer: COMMERCIAL

## 2025-08-18 VITALS
HEIGHT: 69 IN | HEART RATE: 78 BPM | OXYGEN SATURATION: 96 % | DIASTOLIC BLOOD PRESSURE: 81 MMHG | WEIGHT: 207 LBS | BODY MASS INDEX: 30.66 KG/M2 | SYSTOLIC BLOOD PRESSURE: 150 MMHG

## 2025-08-18 DIAGNOSIS — R74.8 ELEVATED LIVER ENZYMES: ICD-10-CM

## 2025-08-18 DIAGNOSIS — F32.A ANXIETY AND DEPRESSION: ICD-10-CM

## 2025-08-18 DIAGNOSIS — Z79.899 DRUG THERAPY: ICD-10-CM

## 2025-08-18 DIAGNOSIS — E66.811 CLASS 1 OBESITY WITH BODY MASS INDEX (BMI) OF 34.0 TO 34.9 IN ADULT, UNSPECIFIED OBESITY TYPE, UNSPECIFIED WHETHER SERIOUS COMORBIDITY PRESENT: ICD-10-CM

## 2025-08-18 DIAGNOSIS — E78.5 HYPERLIPIDEMIA, UNSPECIFIED HYPERLIPIDEMIA TYPE: ICD-10-CM

## 2025-08-18 DIAGNOSIS — E55.9 VITAMIN D DEFICIENCY: Primary | ICD-10-CM

## 2025-08-18 DIAGNOSIS — F41.9 ANXIETY AND DEPRESSION: ICD-10-CM

## 2025-08-18 DIAGNOSIS — Z13.9 SCREENING FOR CONDITION: ICD-10-CM

## 2025-08-18 DIAGNOSIS — I10 HYPERTENSION, UNSPECIFIED TYPE: ICD-10-CM

## 2025-08-18 PROCEDURE — 3079F DIAST BP 80-89 MM HG: CPT | Performed by: FAMILY MEDICINE

## 2025-08-18 PROCEDURE — 99214 OFFICE O/P EST MOD 30 MIN: CPT | Performed by: FAMILY MEDICINE

## 2025-08-18 PROCEDURE — 3008F BODY MASS INDEX DOCD: CPT | Performed by: FAMILY MEDICINE

## 2025-08-18 PROCEDURE — 3077F SYST BP >= 140 MM HG: CPT | Performed by: FAMILY MEDICINE

## 2025-08-18 PROCEDURE — 1036F TOBACCO NON-USER: CPT | Performed by: FAMILY MEDICINE

## 2025-08-18 RX ORDER — PAROXETINE 20 MG/1
20 TABLET, FILM COATED ORAL DAILY
Qty: 90 TABLET | Refills: 1 | Status: SHIPPED | OUTPATIENT
Start: 2025-08-18

## 2025-08-18 RX ORDER — BUPROPION HYDROCHLORIDE 150 MG/1
150 TABLET ORAL DAILY
Qty: 90 TABLET | Refills: 1 | Status: SHIPPED | OUTPATIENT
Start: 2025-08-18

## 2025-08-18 RX ORDER — LOSARTAN POTASSIUM 50 MG/1
50 TABLET ORAL DAILY
Qty: 100 TABLET | Refills: 3 | Status: SHIPPED | OUTPATIENT
Start: 2025-08-18 | End: 2026-09-22

## 2025-08-18 ASSESSMENT — PATIENT HEALTH QUESTIONNAIRE - PHQ9
1. LITTLE INTEREST OR PLEASURE IN DOING THINGS: NOT AT ALL
2. FEELING DOWN, DEPRESSED OR HOPELESS: NOT AT ALL
SUM OF ALL RESPONSES TO PHQ9 QUESTIONS 1 AND 2: 0

## 2025-08-18 ASSESSMENT — ENCOUNTER SYMPTOMS
OCCASIONAL FEELINGS OF UNSTEADINESS: 0
DEPRESSION: 0
LOSS OF SENSATION IN FEET: 0